# Patient Record
Sex: FEMALE | Race: WHITE | NOT HISPANIC OR LATINO
[De-identification: names, ages, dates, MRNs, and addresses within clinical notes are randomized per-mention and may not be internally consistent; named-entity substitution may affect disease eponyms.]

---

## 2023-10-04 ENCOUNTER — NON-APPOINTMENT (OUTPATIENT)
Age: 48
End: 2023-10-04

## 2023-10-04 ENCOUNTER — APPOINTMENT (OUTPATIENT)
Dept: COLORECTAL SURGERY | Facility: CLINIC | Age: 48
End: 2023-10-04
Payer: COMMERCIAL

## 2023-10-04 VITALS — HEIGHT: 66 IN | WEIGHT: 135 LBS | BODY MASS INDEX: 21.69 KG/M2

## 2023-10-04 VITALS
DIASTOLIC BLOOD PRESSURE: 68 MMHG | WEIGHT: 135 LBS | SYSTOLIC BLOOD PRESSURE: 123 MMHG | HEART RATE: 71 BPM | HEIGHT: 66 IN | BODY MASS INDEX: 21.69 KG/M2

## 2023-10-04 DIAGNOSIS — Z80.0 FAMILY HISTORY OF MALIGNANT NEOPLASM OF DIGESTIVE ORGANS: ICD-10-CM

## 2023-10-04 DIAGNOSIS — K60.3 ANAL FISTULA: ICD-10-CM

## 2023-10-04 PROBLEM — Z00.00 ENCOUNTER FOR PREVENTIVE HEALTH EXAMINATION: Status: ACTIVE | Noted: 2023-10-04

## 2023-10-04 PROCEDURE — 99205 OFFICE O/P NEW HI 60 MIN: CPT

## 2023-10-04 RX ORDER — TRETINOIN 1 MG/G
CREAM TOPICAL
Refills: 0 | Status: ACTIVE | COMMUNITY

## 2023-10-12 ENCOUNTER — NON-APPOINTMENT (OUTPATIENT)
Age: 48
End: 2023-10-12

## 2023-11-08 ENCOUNTER — NON-APPOINTMENT (OUTPATIENT)
Age: 48
End: 2023-11-08

## 2023-11-13 ENCOUNTER — APPOINTMENT (OUTPATIENT)
Dept: COLORECTAL SURGERY | Facility: HOSPITAL | Age: 48
End: 2023-11-13

## 2023-11-20 ENCOUNTER — APPOINTMENT (OUTPATIENT)
Dept: COLORECTAL SURGERY | Facility: HOSPITAL | Age: 48
End: 2023-11-20
Payer: COMMERCIAL

## 2023-11-20 ENCOUNTER — NON-APPOINTMENT (OUTPATIENT)
Age: 48
End: 2023-11-20

## 2023-11-20 PROCEDURE — 46050 I&D PERIANAL ABSCESS SUPFC: CPT

## 2023-11-21 ENCOUNTER — NON-APPOINTMENT (OUTPATIENT)
Age: 48
End: 2023-11-21

## 2023-11-28 ENCOUNTER — APPOINTMENT (OUTPATIENT)
Dept: COLORECTAL SURGERY | Facility: CLINIC | Age: 48
End: 2023-11-28
Payer: COMMERCIAL

## 2023-11-28 VITALS
DIASTOLIC BLOOD PRESSURE: 70 MMHG | BODY MASS INDEX: 21.69 KG/M2 | HEIGHT: 66 IN | WEIGHT: 135 LBS | HEART RATE: 75 BPM | SYSTOLIC BLOOD PRESSURE: 118 MMHG

## 2023-11-28 PROCEDURE — 99212 OFFICE O/P EST SF 10 MIN: CPT

## 2023-12-18 ENCOUNTER — NON-APPOINTMENT (OUTPATIENT)
Age: 48
End: 2023-12-18

## 2023-12-20 ENCOUNTER — APPOINTMENT (OUTPATIENT)
Dept: COLORECTAL SURGERY | Facility: CLINIC | Age: 48
End: 2023-12-20
Payer: COMMERCIAL

## 2023-12-20 VITALS
DIASTOLIC BLOOD PRESSURE: 63 MMHG | HEART RATE: 71 BPM | WEIGHT: 135 LBS | HEIGHT: 66 IN | BODY MASS INDEX: 21.69 KG/M2 | SYSTOLIC BLOOD PRESSURE: 107 MMHG

## 2023-12-20 PROCEDURE — 99213 OFFICE O/P EST LOW 20 MIN: CPT

## 2023-12-20 NOTE — PHYSICAL EXAM
[No Rash or Lesion] : No rash or lesion [Alert] : alert [Oriented to Person] : oriented to person [Oriented to Place] : oriented to place [Oriented to Time] : oriented to time [Calm] : calm [de-identified] : Normal [de-identified] : No abscess; the left sided perianal skin opening is smaller and the cavity is much smaller. no fistula [de-identified] : Normal [de-identified] : Normal [de-identified] : Normal [de-identified] : Normal [de-identified] : Normal

## 2023-12-20 NOTE — ASSESSMENT
[FreeTextEntry1] : 48 F here for follow up. She is feeling better following the recent surgery. On exam, no fistula, no abscess; the cavity is smaller. Plan: SItz baths. Moist wipes. Maintain perianal hygiene. See again after 1 month. All questions answered.

## 2023-12-20 NOTE — HISTORY OF PRESENT ILLNESS
[FreeTextEntry1] : 48-year-old female pt s/p anoscopy and drainage of left anterior perianal abscess on 11/20/23 Had drain removed on 11/28/23.  Pt here for follow up  Felt a little tender in the area last Thursday. Took a picture and looks like a "vance". The drain site hasn't closed. Denies fevers or chills. Most noticeable when she is sitting. Feels the area filling up. Notices drainage, feels wet, still has stool leakage. Denies bleeding. Moving her bowels regularly

## 2024-02-21 ENCOUNTER — APPOINTMENT (OUTPATIENT)
Dept: COLORECTAL SURGERY | Facility: CLINIC | Age: 49
End: 2024-02-21
Payer: COMMERCIAL

## 2024-02-21 VITALS
DIASTOLIC BLOOD PRESSURE: 69 MMHG | WEIGHT: 135 LBS | BODY MASS INDEX: 21.69 KG/M2 | HEART RATE: 65 BPM | HEIGHT: 66 IN | SYSTOLIC BLOOD PRESSURE: 118 MMHG

## 2024-02-21 PROCEDURE — 99214 OFFICE O/P EST MOD 30 MIN: CPT

## 2024-02-21 NOTE — HISTORY OF PRESENT ILLNESS
[FreeTextEntry1] : 48-year-old female pt last seen 12/20/23 s/p 11/20/23 for anoscopy and drainage of left anterior perianal abscess.  Drain removed on 11/28/23.  Here for follow up.  Pt reports that she still has drainage, showed us a picture of yellowish drainage and an area with 2 openings in her perianal area. Some days are worse than other days with the amount of drainage. It seemed to flare up a couple of weeks ago. Denies pain but it's uncomfortable. Tender to touch. Denies fevers or chills Moving her bowels, gets smearing after bowel movement for 3-4 hours afterwards.

## 2024-02-21 NOTE — PHYSICAL EXAM
[No Rash or Lesion] : No rash or lesion [Alert] : alert [Oriented to Person] : oriented to person [Oriented to Place] : oriented to place [Oriented to Time] : oriented to time [Calm] : calm [de-identified] : Normal [de-identified] : No abscess; the left sided perianal skin opening is smaller and the cavity is much smaller. no fistula [de-identified] : Normal [de-identified] : Normal [de-identified] : Normal [de-identified] : Normal [de-identified] : Normal

## 2024-02-28 ENCOUNTER — NON-APPOINTMENT (OUTPATIENT)
Age: 49
End: 2024-02-28

## 2024-03-04 ENCOUNTER — APPOINTMENT (OUTPATIENT)
Dept: COLORECTAL SURGERY | Facility: HOSPITAL | Age: 49
End: 2024-03-04
Payer: COMMERCIAL

## 2024-03-04 ENCOUNTER — RESULT REVIEW (OUTPATIENT)
Age: 49
End: 2024-03-04

## 2024-03-04 DIAGNOSIS — G89.18 OTHER ACUTE POSTPROCEDURAL PAIN: ICD-10-CM

## 2024-03-04 PROCEDURE — 46280 REMOVE ANAL FIST COMPLEX: CPT

## 2024-03-05 ENCOUNTER — NON-APPOINTMENT (OUTPATIENT)
Age: 49
End: 2024-03-05

## 2024-03-06 ENCOUNTER — NON-APPOINTMENT (OUTPATIENT)
Age: 49
End: 2024-03-06

## 2024-03-20 ENCOUNTER — APPOINTMENT (OUTPATIENT)
Dept: COLORECTAL SURGERY | Facility: CLINIC | Age: 49
End: 2024-03-20
Payer: COMMERCIAL

## 2024-03-20 VITALS
BODY MASS INDEX: 21.69 KG/M2 | DIASTOLIC BLOOD PRESSURE: 64 MMHG | HEIGHT: 66 IN | HEART RATE: 68 BPM | WEIGHT: 135 LBS | SYSTOLIC BLOOD PRESSURE: 109 MMHG

## 2024-03-20 PROCEDURE — 99024 POSTOP FOLLOW-UP VISIT: CPT

## 2024-03-20 NOTE — PHYSICAL EXAM
[No Rash or Lesion] : No rash or lesion [Alert] : alert [Oriented to Person] : oriented to person [Oriented to Place] : oriented to place [Oriented to Time] : oriented to time [Calm] : calm [de-identified] : Normal [de-identified] : seton in place; we tightened the seton; she tolerated the procedure wll. [de-identified] : Normal [de-identified] : Normal [de-identified] : Normal [de-identified] : Normal [de-identified] : Normal

## 2024-03-20 NOTE — HISTORY OF PRESENT ILLNESS
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  Pt here for post op visit.  Doing well; no pain or fever; seton is in place; mild drainage; no change in anal function as compared to before the procedure. Having regular bowel movements.

## 2024-03-20 NOTE — ASSESSMENT
[FreeTextEntry1] : 48 F here for postop visit. She is making a good recovery from her recent cutting seton placement. Plan: We tightened the seton. Sitz baths. See again after 1-2 weeks. All questions answered.

## 2024-03-27 ENCOUNTER — APPOINTMENT (OUTPATIENT)
Dept: COLORECTAL SURGERY | Facility: CLINIC | Age: 49
End: 2024-03-27
Payer: COMMERCIAL

## 2024-03-27 VITALS
HEART RATE: 64 BPM | SYSTOLIC BLOOD PRESSURE: 103 MMHG | HEIGHT: 66 IN | WEIGHT: 135 LBS | DIASTOLIC BLOOD PRESSURE: 72 MMHG | BODY MASS INDEX: 21.69 KG/M2

## 2024-03-27 PROCEDURE — 99024 POSTOP FOLLOW-UP VISIT: CPT

## 2024-03-27 NOTE — ASSESSMENT
[FreeTextEntry1] : 48 F here for postop visit. She is doing well. The cutting seton has successfully fallen out and the fistulotomy site is clean and healing well. Plan: Sitz baths. High fiber diet. See again after 1 month. All questions answered.

## 2024-03-27 NOTE — PHYSICAL EXAM
[No Rash or Lesion] : No rash or lesion [Alert] : alert [Oriented to Person] : oriented to person [Oriented to Place] : oriented to place [Oriented to Time] : oriented to time [Calm] : calm [de-identified] : Normal [de-identified] : the seton has fallen out; the 'fistulotomy' site is clean and healing well [de-identified] : Normal [de-identified] : Normal [de-identified] : Normal [de-identified] : Normal [de-identified] : Normal

## 2024-03-27 NOTE — HISTORY OF PRESENT ILLNESS
[FreeTextEntry1] : 48-year-old female pt s/p anoscopy, placement of cutting seton in the left anterior anal fistula on 3/4/24. Last seen on 3/20/24- seton was tightened  Seton fell out 3/22/24  Pt here or follow up.  She is doing well; denies pain or bleeding; no adverse impact on continence. Having regular bowel movements.

## 2024-05-08 ENCOUNTER — APPOINTMENT (OUTPATIENT)
Dept: COLORECTAL SURGERY | Facility: CLINIC | Age: 49
End: 2024-05-08
Payer: COMMERCIAL

## 2024-05-08 VITALS
WEIGHT: 141 LBS | BODY MASS INDEX: 22.66 KG/M2 | DIASTOLIC BLOOD PRESSURE: 68 MMHG | SYSTOLIC BLOOD PRESSURE: 107 MMHG | HEIGHT: 66 IN | HEART RATE: 71 BPM

## 2024-05-08 PROCEDURE — 99213 OFFICE O/P EST LOW 20 MIN: CPT | Mod: 24

## 2024-05-08 PROCEDURE — 99024 POSTOP FOLLOW-UP VISIT: CPT

## 2024-05-08 RX ORDER — TRAMADOL HYDROCHLORIDE 50 MG/1
50 TABLET, COATED ORAL
Qty: 10 | Refills: 0 | Status: COMPLETED | COMMUNITY
Start: 2024-03-04 | End: 2024-05-08

## 2024-05-08 NOTE — ASSESSMENT
[FreeTextEntry1] : 48 F here for follow up. She is doing well. The 'fistulotomy' has healed. No adverse effects on her baseline continence. Plan: High fiber diet. Can resume pelvic floor exercises. See again as needed. All questions answered.

## 2024-05-08 NOTE — PHYSICAL EXAM
[No Rash or Lesion] : No rash or lesion [Alert] : alert [Oriented to Person] : oriented to person [Oriented to Place] : oriented to place [Oriented to Time] : oriented to time [Calm] : calm [de-identified] : Normal [de-identified] : the 'fistulotomy' site has helaed [de-identified] : Normal [de-identified] : Normal [de-identified] : Normal [de-identified] : Normal [de-identified] : Normal

## 2024-05-08 NOTE — HISTORY OF PRESENT ILLNESS
[FreeTextEntry1] : 48-year-old female s/p anoscopy, placement of cutting seton in the left anterior anal fistula on 3/4/24 Last seen on 3/27/24, Seton had fallen out 3/22/24 Here for follow up  Pt reports feeling pretty good Denies drainage Denies pain Moving her bowels regularly  Denies bleeding